# Patient Record
Sex: MALE | URBAN - NONMETROPOLITAN AREA
[De-identification: names, ages, dates, MRNs, and addresses within clinical notes are randomized per-mention and may not be internally consistent; named-entity substitution may affect disease eponyms.]

---

## 2017-04-02 ENCOUNTER — HOSPITAL ENCOUNTER (EMERGENCY)
Facility: HOSPITAL | Age: 42
Discharge: HOME | End: 2017-04-02
Admitting: NURSE PRACTITIONER
Payer: COMMERCIAL

## 2017-04-02 VITALS
TEMPERATURE: 96.2 F | SYSTOLIC BLOOD PRESSURE: 135 MMHG | RESPIRATION RATE: 20 BRPM | DIASTOLIC BLOOD PRESSURE: 91 MMHG | OXYGEN SATURATION: 97 % | HEART RATE: 86 BPM

## 2017-04-02 DIAGNOSIS — R06.2 WHEEZING: ICD-10-CM

## 2017-04-02 DIAGNOSIS — J06.9 VIRAL UPPER RESPIRATORY TRACT INFECTION: ICD-10-CM

## 2017-04-02 PROCEDURE — 99203 OFFICE O/P NEW LOW 30 MIN: CPT | Performed by: NURSE PRACTITIONER

## 2017-04-02 PROCEDURE — 99212 OFFICE O/P EST SF 10 MIN: CPT

## 2017-04-02 ASSESSMENT — ENCOUNTER SYMPTOMS
SORE THROAT: 0
COUGH: 1
ABDOMINAL PAIN: 0
RHINORRHEA: 0
FEVER: 0
WHEEZING: 1
SHORTNESS OF BREATH: 1
MUSCULOSKELETAL NEGATIVE: 1

## 2017-04-03 NOTE — ED PROVIDER NOTES
History   No chief complaint on file.    The history is provided by the patient. No  was used.     Syed Thayer is a 41 year old male who presents with a dry cough and wheezing for 1 days. Feels some shortness of breath with the wheeze. No fever or sinus congestion. No flu or strep exposure. No medications for sx.   No hx of smoking or asthma. Denies all pain.     I have reviewed the Medications, Allergies, Past Medical and Surgical History, and Social History in the Epic system.    Review of Systems   Constitutional: Negative for fever.   HENT: Negative for congestion, rhinorrhea and sore throat.    Respiratory: Positive for cough, shortness of breath and wheezing.    Cardiovascular: Negative for chest pain.   Gastrointestinal: Negative for abdominal pain.   Musculoskeletal: Negative.    Skin: Negative.        Physical Exam      Physical Exam   Constitutional: He is oriented to person, place, and time. Vital signs are normal. He appears well-developed and well-nourished. He is active and cooperative.  Non-toxic appearance. He does not appear ill. No distress.   Sitting comfortably in exam chair, ambulates easily to exam table.    HENT:   Head: Normocephalic and atraumatic.   Right Ear: Tympanic membrane and external ear normal.   Left Ear: Tympanic membrane and external ear normal.   Nose: Nose normal.   Mouth/Throat: Uvula is midline and oropharynx is clear and moist. No oropharyngeal exudate.   Eyes: Conjunctivae and lids are normal. No scleral icterus.   Neck: Normal range of motion. Neck supple. No rigidity. No edema, no erythema and normal range of motion present.   Cardiovascular: Normal rate, regular rhythm and normal heart sounds.    No murmur heard.  Pulmonary/Chest: Effort normal and breath sounds normal. No respiratory distress. He has no decreased breath sounds. He has no rales.   Audible wheeze   Musculoskeletal: Normal range of motion.   Lymphadenopathy:     He has no cervical  adenopathy.   Neurological: He is alert and oriented to person, place, and time.   Skin: Skin is warm, dry and intact. He is not diaphoretic.   Psychiatric: He has a normal mood and affect. His speech is normal.   Nursing note and vitals reviewed.      ED Course     ED Course     Procedures         Labs Ordered and Resulted from Time of ED Arrival Up to the Time of Departure from the ED - No data to display    Given a duoneb in office, reports improvement of sx.     Assessments & Plan (with Medical Decision Making)     I have reviewed the nursing notes.  I have reviewed the findings, diagnosis, plan and need for follow up with the patient.  Given written script for albuterol inhaler. Given instructions on inhaler use.   Take ibuprofen or Tylenol as directed on the bottle as needed.  Take OTC cold medicine as directed on bottle  Take prescription medications as directed.  Increase fluids and rest.  Return to ED/UC if symptoms worsen or concerns develop  If symptoms persist 5-7 days follow up with PCP for re-evaluation.    Final diagnoses:   Viral upper respiratory tract infection   Wheezing       4/2/2017   [facility]     Xochitl Smith NP  04/02/17 3415

## 2018-05-20 ENCOUNTER — HOSPITAL ENCOUNTER (EMERGENCY)
Facility: HOSPITAL | Age: 43
Discharge: HOME | End: 2018-05-20
Attending: NURSE PRACTITIONER | Admitting: NURSE PRACTITIONER
Payer: COMMERCIAL

## 2018-05-20 ENCOUNTER — APPOINTMENT (OUTPATIENT)
Dept: GENERAL RADIOLOGY | Facility: HOSPITAL | Age: 43
End: 2018-05-20
Attending: NURSE PRACTITIONER
Payer: COMMERCIAL

## 2018-05-20 VITALS
OXYGEN SATURATION: 95 % | HEIGHT: 67 IN | TEMPERATURE: 97 F | SYSTOLIC BLOOD PRESSURE: 139 MMHG | BODY MASS INDEX: 29.03 KG/M2 | WEIGHT: 185 LBS | DIASTOLIC BLOOD PRESSURE: 96 MMHG | RESPIRATION RATE: 16 BRPM

## 2018-05-20 DIAGNOSIS — S63.91XA SPRAIN OF HAND, RIGHT, INITIAL ENCOUNTER: ICD-10-CM

## 2018-05-20 DIAGNOSIS — S63.501A WRIST SPRAIN, RIGHT, INITIAL ENCOUNTER: ICD-10-CM

## 2018-05-20 PROCEDURE — 73110 X-RAY EXAM OF WRIST: CPT | Mod: TC,RT

## 2018-05-20 PROCEDURE — G0463 HOSPITAL OUTPT CLINIC VISIT: HCPCS

## 2018-05-20 PROCEDURE — 99213 OFFICE O/P EST LOW 20 MIN: CPT | Performed by: NURSE PRACTITIONER

## 2018-05-20 PROCEDURE — 73130 X-RAY EXAM OF HAND: CPT | Mod: TC,RT
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
## 2018-05-20 NOTE — ED AVS SNAPSHOT
[facility]    [address]    [place] [address]    Phone:  [phone]                                       Syed Thayer   MRN: [id number]    Department:  [facility]   Date of Visit:  5/20/2018           After Visit Summary Signature Page     I have received my discharge instructions, and my questions have been answered. I have discussed any challenges I see with this plan with the nurse or doctor.    ..........................................................................................................................................  Patient/Patient Representative Signature      ..........................................................................................................................................  Patient Representative Print Name and Relationship to Patient    ..................................................               ................................................  Date                                            Time    ..........................................................................................................................................  Reviewed by Signature/Title    ...................................................              ..............................................  Date                                                            Time

## 2018-05-20 NOTE — ED AVS SNAPSHOT
[facility]    [address] [address]    Phone:  [phone]                                       Syed Thayer   MRN: [id number]    Department:  [facility]   Date of Visit:  5/20/2018           Patient Information     Date Of Birth          1975        Your diagnoses for this visit were:     Wrist sprain, right, initial encounter     Sprain of hand, right, initial encounter        You were seen by Vidhya Perez NP.      Follow-up Information     Follow up with Andrea Vazquez MD In 10 days.    Specialty:  Family Practice    Why:  If symptoms persist.     Contact information:    [facility]  [address] [place] [address] [id number] [phone]          Follow up with [facility].    Specialty:  EMERGENCY MEDICINE    Why:  As needed, If symptoms worsen    Contact information:    [address] [address] [phone]    Additional information:    From [place]: Take [address]. Turn left at [address]. Turn left at the first stoplight on [address]. At the first stop sign, take a right onto [address]. Take a left into the parking lot and continue through until you reach the North enterance of the building.       From [place]: Take [address]. Take the [address] ramp towards [place]. Turn left onto [address]. Take a slight right onto [address]. Turn left at the first stoplight on [address]. At the first stop sign, take a right onto [address]. Take a left into the parking lot and continue through until you reach the North enterance of the building.       From [place]: Take [address]. Take a right at [address]. At the first stop sign, take a right onto [address]. Take a left into the parking lot and continue through until you reach the North enterance of the building.         Discharge Instructions       Take tylenol and/or  "ibuprofen for pain. Follow dosing on package.   Apply ice to right hand/wrist for 20 minutes every 1-2 hours. Protect skin.   Elevate right hand/wrist as much as able.   See RICE handout.   Wear splint to right wrist until pain improves.   Follow up with PCP in 10 days if symptoms persist.   Return to urgent care or emergency department with any increase in symptoms or concerns.       Discharge References/Attachments     WRIST SPRAIN (ENGLISH)    HAND SPRAIN (ENGLISH)    STRAINS AND SPRAINS, TREATING (ENGLISH)    STRAINS AND SPRAINS, SELF-CARE FOR (ENGLISH)    R.I.C.E. (ENGLISH)         Review of your medicines      Our records show that you are taking the medicines listed below. If these are incorrect, please call your family doctor or clinic.        Dose / Directions Last dose taken    AMBIEN PO   Dose:  12.5 mg        Take 12.5 mg by mouth nightly as needed for sleep   Refills:  0        EFFEXOR PO   Dose:  75 mg        Take 75 mg by mouth daily   Refills:  0                Procedures and tests performed during your visit     XR Hand Right G/E 3 Views    XR Wrist Right G/E 3 Views      Orders Needing Specimen Collection     None      Pending Results     No orders found from 5/18/2018 to 5/21/2018.            Pending Culture Results     No orders found from 5/18/2018 to 5/21/2018.            Thank you for choosing [facility]       Thank you for choosing [facility] for your care. Our goal is always to provide you with excellent care. Hearing back from our patients is one way we can continue to improve our services. Please take a few minutes to complete the written survey that you may receive in the mail after you visit with us. Thank you!        [employer]hart Information     [employer] lets you send messages to your doctor, view your test results, renew your prescriptions, schedule appointments and more. To sign up, go to [email or web address] . Click on \"Log in\" on the left side of the screen, which will take you to the " "Welcome page. Then click on \"Sign up Now\" on the right side of the page.     You will be asked to enter the access code listed below, as well as some personal information. Please follow the directions to create your username and password.     Your access code is: [email or web address]  Expires: 2018  5:38 PM     Your access code will  in 90 days. If you need help or a new code, please call your [facility] clinic or [phone].        Care EveryWhere ID     This is your Care EveryWhere ID. This could be used by other organizations to access your [facility] medical records  [id number]        Equal Access to Services     SHELLY SNYDER : Tiffanie Dockery, korey valle, shahla burnham, isabelle lombardi. So [facility] [phone].    ATENCIÓN: Si habla español, tiene a helton disposición servicios gratuitos de asistencia lingüística. Llame al [phone].    We comply with applicable federal civil rights laws and [place] laws. We do not discriminate on the basis of race, color, national origin, age, disability, sex, sexual orientation, or gender identity.            After Visit Summary       This is your record. Keep this with you and show to your community pharmacist(s) and doctor(s) at your next visit.                  "

## 2018-05-23 ASSESSMENT — ENCOUNTER SYMPTOMS
DYSURIA: 0
WOUND: 0
TROUBLE SWALLOWING: 0
WEAKNESS: 0
FEVER: 0
COUGH: 0
CHILLS: 0
PSYCHIATRIC NEGATIVE: 1
APPETITE CHANGE: 0
ACTIVITY CHANGE: 1
NUMBNESS: 0

## 2018-05-23 NOTE — DISCHARGE INSTRUCTIONS
Take tylenol and/or ibuprofen for pain. Follow dosing on package.   Apply ice to right hand/wrist for 20 minutes every 1-2 hours. Protect skin.   Elevate right hand/wrist as much as able.   See RICE handout.   Wear splint to right wrist until pain improves.   Follow up with PCP in 10 days if symptoms persist.   Return to urgent care or emergency department with any increase in symptoms or concerns.